# Patient Record
Sex: MALE | Race: WHITE
[De-identification: names, ages, dates, MRNs, and addresses within clinical notes are randomized per-mention and may not be internally consistent; named-entity substitution may affect disease eponyms.]

---

## 2017-03-23 ENCOUNTER — HOSPITAL ENCOUNTER (EMERGENCY)
Dept: HOSPITAL 61 - ER | Age: 42
Discharge: HOME | End: 2017-03-23
Payer: COMMERCIAL

## 2017-03-23 VITALS — SYSTOLIC BLOOD PRESSURE: 128 MMHG | DIASTOLIC BLOOD PRESSURE: 81 MMHG

## 2017-03-23 DIAGNOSIS — H66.012: ICD-10-CM

## 2017-03-23 DIAGNOSIS — J40: Primary | ICD-10-CM

## 2017-03-23 DIAGNOSIS — J45.909: ICD-10-CM

## 2017-03-23 PROCEDURE — 71020: CPT

## 2017-03-23 PROCEDURE — 99284 EMERGENCY DEPT VISIT MOD MDM: CPT

## 2017-03-23 PROCEDURE — 94250: CPT

## 2017-03-23 PROCEDURE — 94640 AIRWAY INHALATION TREATMENT: CPT

## 2017-03-23 NOTE — PHYS DOC
Past Medical History


Past Medical History:  Asthma


Past Surgical History:  Other


Additional Past Surgical Histo:  nose, right ear


Additional Information:  


Nonsmoker


Alcohol Use:  None


Drug Use:  None





Adult General


Chief Complaint


Chief Complaint:  Congestion





HPI


HPI


Patient is a 42  year old male with history of asthma who presents with 

productive cough and left ear pain for 4 days. He reports shortness of breath 

and nasal congestion as well. He denies fever, sore throat, vomiting, or 

diarrhea. He wears a hearing aid in the left ear. He states that there has been 

mild drainage from the ear, enough to make the hearing aid slightly damp. He 

takes Advair, pro-air, albuterol nebulizer, allergy medication, and Flonase at 

home. He has received a flu shot this season. He denies any known sick 

contacts. His PCP is Dr. uDval.





Review of Systems


Review of Systems


Constitutional: Denies fever or chills. []


Eyes: Denies change in visual acuity, redness, or eye pain. []


HENT: Denies sore throat. Reports left ear pain and drainage and nasal 

congestion.


Respiratory: Reports productive cough and shortness of breath.


Cardiovascular: Denies chest pain, palpitations or edema. []


GI: Denies abdominal pain, nausea, vomiting, bloody stools or diarrhea. []


: Denies dysuria, hematuria or urinary frequency. []


Musculoskeletal: Denies back pain or joint pain. []


Integument: Denies rash or skin lesions. []


Neurologic: Denies headache, focal weakness or sensory changes. []


Endocrine: Denies polyuria or polydipsia. []


Psych: Denies anxiety or depression. []





All systems reviewed and negative unless otherwise stated in the HPI.





Current Medications


Current Medications





 Current Medications








 Medications


  (Trade)  Dose


 Ordered  Sig/Cat  Start Time


 Stop Time Status Last Admin


Dose Admin


 


 Albuterol/


 Ipratropium


  (Duoneb)  3 ml  1X  ONCE  3/23/17 22:15


 3/23/17 22:16 DC 3/23/17 22:30


3 ML











Allergies


Allergies





 Allergies








Coded Allergies Type Severity Reaction Last Updated Verified


 


  No Known Drug Allergies    2/10/16 No











Physical Exam


Physical Exam





Constitutional: Well developed, well nourished, no acute distress, non-toxic 

appearance. []


HENT: Normocephalic, atraumatic, bilateral external ears normal, oropharynx 

moist, no oral exudates, nose normal. Right TM without erythema or bulging. 

Left TM is erythematous and perforated with mild purulent drainage in the ear 

canal without edema. There is no posterior pharyngeal erythema or tonsillar 

edema. Bilateral nasal turbinates are swollen and erythematous with purulent 

drainage.


Eyes: PERRLA, EOMI, conjunctiva normal, no discharge. [] 


Neck: Normal range of motion, no tenderness, supple, no stridor. [] 


Cardiovascular: Heart rate regular rhythm, no murmur []


Lungs & Thorax: There are diffuse expiratory wheezes with mild inspiratory 

wheezes in the lung bases bilaterally. No respiratory distress.


Skin: Warm, dry, no erythema, no rash. [] 


Neurologic: Alert and oriented X 3, normal motor function, normal sensory 

function, no focal deficits noted. []


Psychologic: Affect normal, judgement normal, mood normal. []





Current Patient Data


Vital Signs





 Vital Signs








  Date Time  Temp Pulse Resp B/P Pulse Ox O2 Delivery O2 Flow Rate FiO2


 


3/23/17 22:29     94 Room Air  


 


3/23/17 21:30 97.5 92 22     





 97.5       











EKG


EKG


[]





Radiology/Procedures


Radiology/Procedures


PA and lateral chest x-ray reviewed and interpreted by myself with Dr. Mckeon. 

There are no focal infiltrates or acute cardiopulmonary process.





Course & Med Decision Making


Course & Med Decision Making


Pertinent Labs and Imaging studies reviewed. (See chart for details)





The patient received a DuoNeb treatment in the emergency department. He reports 

improved breathing. He has mild scattered inspiratory wheezes diffusely.





Dragon Disclaimer


Dragon Disclaimer


This electronic medical record was generated, in whole or in part, using a 

voice recognition dictation system.





Departure


Departure


Impression:  


 Primary Impression:  


 Otitis media


 Additional Impression:  


 Bronchitis


Disposition:  01 HOME, SELF-CARE


Condition:  IMPROVED


Referrals:  


LAILA DUVAL MD (PCP)


Patient Instructions:  Acute Bronchitis, Easy-to-Read, Otitis Media, Adult, Easy

-to-Read





Additional Instructions:


Your chest x-ray does not show any pneumonia.


The left ear has an infection that has caused the eardrum to rupture.


Please complete all the prescribed antibiotics and steroids, even if you are 

feeling better.


Please continue to use your home medications as directed.


Return to the emergency department if you have any new or concerning symptoms.


Scripts


Amoxicillin 500 Mg Tablet2 Tab PO TID 10 Days


   Prov:SHAMIKA FERNANDEZ         3/23/17


Benzonatate 200 Mg Capsule1 Cap PO TID #30 CAP


   Prov:SHAMIKA FERNANDEZ         3/23/17


Prednisone 20 Mg Hchyeg32 Mg PO DAILY 5 Days


   Prov:SHAMIKA FERNANDEZ         3/23/17





Problem Qualifiers








 Primary Impression:  


 Otitis media


 Otitis media type:  suppurative  Laterality:  left  Chronicity:  acute  

Recurrence:  not specified as recurrent  Spontaneous tympanic membrane rupture:

  with spontaneous rupture  Qualified Code:  H66.012 - Acute suppurative otitis 

media with spontaneous rupture of ear drum, left ear





SHAMIKA FERNANDEZ Mar 23, 2017 22:58

## 2017-03-24 NOTE — RAD
Indication cough.



PA and lateral views of the chest were obtained and are compared to an

examination 9/29/2016.



The heart and pulmonary vessels appear within normal limits. A focal

infiltrate is not seen. Significant pleural fluid is not present and there is

no pneumothorax.



IMPRESSION: No acute or focal process seen in the chest

## 2017-07-01 ENCOUNTER — HOSPITAL ENCOUNTER (EMERGENCY)
Dept: HOSPITAL 61 - ER | Age: 42
Discharge: HOME | End: 2017-07-01
Payer: MEDICARE

## 2017-07-01 VITALS — DIASTOLIC BLOOD PRESSURE: 75 MMHG | SYSTOLIC BLOOD PRESSURE: 142 MMHG

## 2017-07-01 DIAGNOSIS — H60.392: Primary | ICD-10-CM

## 2017-07-01 DIAGNOSIS — J45.909: ICD-10-CM

## 2017-07-01 DIAGNOSIS — Z88.8: ICD-10-CM

## 2017-07-01 PROCEDURE — 99283 EMERGENCY DEPT VISIT LOW MDM: CPT

## 2017-07-01 NOTE — PHYS DOC
Past Medical History


Past Medical History:  Asthma


Past Surgical History:  Other


Additional Past Surgical Histo:  nose, right ear


Alcohol Use:  None


Drug Use:  None





Adult General


Chief Complaint


Chief Complaint:  EARACHE/EAR PAIN





HPI


HPI





Patient is a 42  year old male with history of asthma [place] on the left currently 

using hearing aids on the left who presents today with left ear pain and 

drainage that began today. Patient denies any fever coughing or congestion. 

Patient denies any worsening on his hearing condition. Patient denies swimming.





Review of Systems


Review of Systems





Constitutional: Denies fever or chills []


Eyes: Denies change in visual acuity, redness, or eye pain []


HENT:  left ear pain and drainage


Respiratory: Denies cough or shortness of breath []


Cardiovascular: No additional information not addressed in HPI []


Musculoskeletal: Denies back pain or joint pain []


Neurologic: Denies headache, focal weakness or sensory changes []


Endocrine: Denies polyuria or polydipsia []





Allergies


Allergies





Allergies








Coded Allergies Type Severity Reaction Last Updated Verified


 


  phenobarbital Allergy Severe SOB 7/1/17 Yes











Physical Exam


Physical Exam





Constitutional: Well developed, well nourished, no acute distress, non-toxic 

appearance. []


HENT: Normocephalic, atraumatic, bilateral external ears normal, oropharynx 

moist, no oral exudates, nose normal. []


Left ear canal is narrowed. There is mild amount of yellow debris in the left 

ear canal. Tragus is very painful on exam.


Eyes: PERRLA, EOMI, conjunctiva normal, no discharge. [] 


Cardiovascular:Heart rate regular rhythm, no murmur []


Lungs & Thorax:  Bilateral breath sounds clear to auscultation []


Skin: Warm, dry, no erythema, no rash. [] 


Back: No tenderness, no CVA tenderness. [] 


Extremities: No tenderness, no cyanosis, no clubbing, ROM intact, no edema. [] 


Neurologic: Alert and oriented X 3, normal motor function, normal sensory 

function, no focal deficits noted. []


Psychologic: Affect normal, judgement normal, mood normal. []





Current Patient Data


Vital Signs





 Vital Signs








  Date Time  Temp Pulse Resp B/P (MAP) Pulse Ox O2 Delivery O2 Flow Rate FiO2


 


7/1/17 18:34 97.6 63 20  100 Room Air  





 97.6       











EKG


EKG


[]





Radiology/Procedures


Radiology/Procedures


[]





Course & Med Decision Making


Course & Med Decision Making


Pertinent Labs and Imaging studies reviewed. (See chart for details)





Patient has left otitis externa. Discharged with oflaxacin,  follow-up with PCP 

in 1-2 weeks.





Dragon Disclaimer


Dragon Disclaimer


This electronic medical record was generated, in whole or in part, using a 

voice recognition dictation system.





Departure


Departure


Impression:  


 Primary Impression:  


 Left otitis externa


Disposition:  01 HOME, SELF-CARE


Condition:  STABLE


Referrals:  


LAILA QUAN MD (PCP)


Follow-up with your doctor in 1-2 weeks.


Patient Instructions:  Otitis Externa, Easy-to-Read





Additional Instructions:  


You were seen for left ear infection. Use the eardrops as prescribed. Follow-up 

with your doctor in 1-2 weeks.


Scripts


Ofloxacin (OFLOXACIN) 5 Ml Drops


5 DROP EACH EAR BID, #10 ML


   Prov: NICKY DAVIS APRN         7/1/17





Problem Qualifiers








 Primary Impression:  


 Left otitis externa


 Otitis externa type:  other infective  Chronicity:  acute  Qualified Codes:  

H60.392 - Other infective otitis externa, left ear








NICKY DAVIS APRN Jul 1, 2017 19:07

## 2019-03-09 ENCOUNTER — HOSPITAL ENCOUNTER (EMERGENCY)
Dept: HOSPITAL 61 - ER | Age: 44
Discharge: HOME | End: 2019-03-09
Payer: MEDICARE

## 2019-03-09 VITALS — DIASTOLIC BLOOD PRESSURE: 82 MMHG | SYSTOLIC BLOOD PRESSURE: 157 MMHG

## 2019-03-09 VITALS — WEIGHT: 175 LBS | BODY MASS INDEX: 25.92 KG/M2 | HEIGHT: 69 IN

## 2019-03-09 DIAGNOSIS — H66.004: Primary | ICD-10-CM

## 2019-03-09 DIAGNOSIS — J45.909: ICD-10-CM

## 2019-03-09 DIAGNOSIS — H66.015: ICD-10-CM

## 2019-03-09 DIAGNOSIS — Z88.8: ICD-10-CM

## 2019-03-09 PROCEDURE — 99283 EMERGENCY DEPT VISIT LOW MDM: CPT

## 2019-03-09 NOTE — PHYS DOC
Past Medical History


Past Medical History:  Asthma


 (JON ANGULO)


Past Surgical History:  Other


Additional Past Surgical Histo:  nose, right ear


 (JON ANGULO)


Alcohol Use:  None


Drug Use:  None


 (JON ANGULO)





Adult General


Chief Complaint


Chief Complaint:  EARACHE/EAR PAIN





HPI


HPI





Patient is a 43  year old male who was diagnosed with an ear infection by his 

PCP 4 days ago, he was unable to get the antibiotic that was prescribed until 

yesterday. Pt states he has taken 3 doses of the medication and his symptoms 

have not improved and his left ear has started draining fluid. Pt rates his 

pain a 6/10 on the pain scale, there are no alleviating or exacerbating 

factors. 


 (JON ANGULO)





Review of Systems


Review of Systems





Constitutional: Denies fever or chills []


Eyes: Denies redness, or eye pain []


HENT: Denies nasal congestion or sore throat ; see HPI[]


Respiratory: Denies cough or shortness of breath []


Cardiovascular: No additional information not addressed in HPI []


GI: Denies abdominal pain, nausea, vomiting,  or diarrhea []


Integument: Denies rash or skin lesions []


Neurologic: Denies headache


 (JON ANGULO)





Allergies


Allergies





Allergies








Coded Allergies Type Severity Reaction Last Updated Verified


 


  phenobarbital Allergy Severe SOB 7/1/17 Yes





 (LINDA CASTANON MD)





Physical Exam


Physical Exam





Constitutional: Well developed, well nourished, no acute distress, non-toxic 

appearance. []


HENT: Normocephalic, atraumatic, bilateral external ears normal, bilateral TMs 

infected- right TM effusion with moderate pus fluid, left TM unable to 

visualize perforation large amount of purulent drainage, posterior pharynx 

normal, oropharynx moist, no oral exudates, nose normal. []


Eyes:  conjunctiva normal, no discharge. [] 


Neck: Normal range of motion,  no stridor. [] 


Cardiovascular:Heart rate regular rhythm, no murmur []


Lungs & Thorax:  Bilateral breath sounds clear to auscultation []


Skin: Warm, dry, no erythema, no rash. [] 


Neurologic: Alert and oriented X 3,  no focal deficits noted. []


Psychologic: Affect normal, judgement normal, mood normal. []


 (JON ANGULO)





Current Patient Data


Vital Signs





 Vital Signs








  Date Time  Temp Pulse Resp B/P (MAP) Pulse Ox O2 Delivery O2 Flow Rate FiO2


 


3/9/19 20:36 97.7 87 16 157/82 (107) 96 Room Air  





 97.7       





 (LINDA CASTANON MD)





EKG


EKG


[]


 (JON ANGULO)





Radiology/Procedures


Radiology/Procedures


[]


 (JON ANGULO)





Course & Med Decision Making


Course & Med Decision Making


Pertinent Labs and Imaging studies reviewed. (See chart for details)





[]


 (JON ANGULO)


Course & Med Decision Making





Staff Physician Addendum:


I was working in the ER during the course of this patient's visit.  I was 

available for consultation as needed, but I was not directly involved in the 

care of this patient.    


 (LINDA CASTANON MD)


Dragon Disclaimer


Dragon Disclaimer


This electronic medical record was generated, in whole or in part, using a 

voice recognition dictation system.


 (JON ANGULO)





Departure


Departure


Impression:  


 Primary Impression:  


 Acute suppurative otitis media of left ear with spontaneous rupture of 

tympanic membrane


 Additional Impression:  


 Acute suppurative otitis media of right ear without spontaneous rupture of 

tympanic membrane


Disposition:  01 HOME, SELF-CARE


Condition:  STABLE


Referrals:  


LAILA QUAN MD (PCP)


Patient Instructions:  Otitis Media, Adult





Additional Instructions:  


Fill the prescription and take as directed in addition to the antibiotic 

prescribed by your primary care doctor. Tylenol or ibuprofen as needed for 

pain. Follow up with your primary care doctor next week for re-examination, 

return to the ER if symptoms worsen.


Scripts


Ofloxacin (OFLOXACIN) 5 Ml Drops


5 DROP LEFT EAR BID for 5 Days, #10 ML 0 Refills


   Prov: JON ANGULO         3/9/19





Problem Qualifiers








 Primary Impression:  


 Acute suppurative otitis media of left ear with spontaneous rupture of 

tympanic membrane


 Recurrence:  recurrent  Qualified Codes:  H66.015 - Acute suppurative otitis 

media with spontaneous rupture of ear drum, recurrent, left ear


 Additional Impression:  


 Acute suppurative otitis media of right ear without spontaneous rupture of 

tympanic membrane


 Recurrence:  recurrent  Qualified Codes:  H66.004 - Acute suppurative otitis 

media without spontaneous rupture of ear drum, recurrent, right ear








JON ANGULO Mar 9, 2019 21:20


LINDA CASTANON MD Mar 15, 2019 06:22

## 2019-03-31 ENCOUNTER — HOSPITAL ENCOUNTER (EMERGENCY)
Dept: HOSPITAL 61 - ER | Age: 44
LOS: 1 days | Discharge: HOME | End: 2019-04-01
Payer: MEDICARE

## 2019-03-31 VITALS — BODY MASS INDEX: 27.43 KG/M2 | WEIGHT: 181 LBS | HEIGHT: 68 IN

## 2019-03-31 VITALS — SYSTOLIC BLOOD PRESSURE: 163 MMHG | DIASTOLIC BLOOD PRESSURE: 81 MMHG

## 2019-03-31 DIAGNOSIS — H66.001: Primary | ICD-10-CM

## 2019-03-31 DIAGNOSIS — J45.909: ICD-10-CM

## 2019-03-31 DIAGNOSIS — I10: ICD-10-CM

## 2019-03-31 DIAGNOSIS — Z88.8: ICD-10-CM

## 2019-03-31 PROCEDURE — 99281 EMR DPT VST MAYX REQ PHY/QHP: CPT

## 2019-03-31 NOTE — PHYS DOC
Past Medical History


Past Medical History:  Asthma, Hypertension, Seizure


Past Surgical History:  Other


Additional Past Surgical Histo:  nose, right ear


Alcohol Use:  None


Drug Use:  None





Adult General


Chief Complaint


Chief Complaint:  EARACHE/EAR PAIN





HPI


HPI





Patient is a 44  year old male presented to the ER today for evaluation of 

fluid coming out of his right ear.  Patient has exudative otitis media on  

right ear.  Patient is currently on augmentin and ciprodex.  He has been put 

cipro solution in his right ear.  He also has been wearing hearing aid on his 

right are.  Today, his mom noted the drainage coming out of right ear with 

yellow color tinged with blood.  So she asked  him to come here for evaluation.





Review of Systems


Review of Systems





Constitutional: Denies fever or chills []


Eyes: Denies change in visual acuity, redness, or eye pain []


HENT: Denies nasal congestion or sore throat []


Respiratory: Denies cough or shortness of breath []


Cardiovascular: No additional information not addressed in HPI []


GI: Denies abdominal pain, nausea, vomiting, bloody stools or diarrhea []


: Denies dysuria or hematuria []


Musculoskeletal: Denies back pain or joint pain []


Integument: Denies rash or skin lesions []


Neurologic: Denies headache, focal weakness or sensory changes []


Endocrine: Denies polyuria or polydipsia []





All other systems were reviewed and found to be within normal limits, except as 

documented in this note.





Allergies


Allergies





Allergies








Coded Allergies Type Severity Reaction Last Updated Verified


 


  phenobarbital Allergy Severe SOB 7/1/17 Yes











Physical Exam


Physical Exam





Constitutional: Well developed, well nourished, no acute distress, non-toxic 

appearance. []


HENT: Normocephalic, atraumatic, clear fluid in right external ear canal 

consistent with the same concentration of CIPRO SOLUTION THAT HE HAS BEEN 

PUTTING IN HIS RIGHT EAR.  NO BLEEDING.  


Eyes: PERRLA, EOMI, conjunctiva normal, no discharge. [] 


Neck: Normal range of motion, no tenderness, supple, no stridor. [] 


Cardiovascular:Heart rate regular rhythm, no murmur []


Lungs & Thorax:  Bilateral breath sounds clear to auscultation []





Neurologic: Alert and oriented X 3, normal motor function, normal sensory 

function, no focal deficits noted. []


Psychologic: Affect normal, judgement normal, mood normal. []





Current Patient Data


Vital Signs





 Vital Signs








  Date Time  Temp Pulse Resp B/P (MAP) Pulse Ox O2 Delivery O2 Flow Rate FiO2


 


3/31/19 23:42 98.4 108 20 163/81 (108) 99 Room Air  





 98.4       











EKG


EKG


[]





Radiology/Procedures


Radiology/Procedures


[]





Course & Med Decision Making


Course & Med Decision Making


Pertinent Labs and Imaging studies reviewed. (See chart for details)





[]





Dragon Disclaimer


Dragon Disclaimer


This electronic medical record was generated, in whole or in part, using a 

voice recognition dictation system.





Departure


Departure


Impression:  


 Primary Impression:  


 Acute suppurative otitis media of right ear without spontaneous rupture of 

tympanic membrane


Disposition:  01 HOME, SELF-CARE


Condition:  STABLE


Referrals:  


LAILA QUAN MD (PCP)


YOU WILL NEED TO FOLLOW UP WITH ENT doctor next week for reevaluation.


Patient Instructions:  Otitis Media with Effusion





Additional Instructions:  


Please continue your current antibiotic.  Follow up with ENT doctor next week 

for reevaluation.











URBANO SALMON DO Mar 31, 2019 23:57

## 2020-12-24 ENCOUNTER — HOSPITAL ENCOUNTER (EMERGENCY)
Dept: HOSPITAL 61 - ER | Age: 45
Discharge: HOME | End: 2020-12-24
Payer: COMMERCIAL

## 2020-12-24 VITALS — BODY MASS INDEX: 25.96 KG/M2 | HEIGHT: 69 IN | WEIGHT: 175.27 LBS

## 2020-12-24 VITALS — DIASTOLIC BLOOD PRESSURE: 74 MMHG | SYSTOLIC BLOOD PRESSURE: 139 MMHG

## 2020-12-24 DIAGNOSIS — Z98.890: ICD-10-CM

## 2020-12-24 DIAGNOSIS — Z88.8: ICD-10-CM

## 2020-12-24 DIAGNOSIS — R50.9: ICD-10-CM

## 2020-12-24 DIAGNOSIS — H92.02: ICD-10-CM

## 2020-12-24 DIAGNOSIS — U07.1: Primary | ICD-10-CM

## 2020-12-24 DIAGNOSIS — G40.909: ICD-10-CM

## 2020-12-24 DIAGNOSIS — I10: ICD-10-CM

## 2020-12-24 DIAGNOSIS — J45.909: ICD-10-CM

## 2020-12-24 PROCEDURE — U0003 INFECTIOUS AGENT DETECTION BY NUCLEIC ACID (DNA OR RNA); SEVERE ACUTE RESPIRATORY SYNDROME CORONAVIRUS 2 (SARS-COV-2) (CORONAVIRUS DISEASE [COVID-19]), AMPLIFIED PROBE TECHNIQUE, MAKING USE OF HIGH THROUGHPUT TECHNOLOGIES AS DESCRIBED BY CMS-2020-01-R: HCPCS

## 2020-12-24 PROCEDURE — C9803 HOPD COVID-19 SPEC COLLECT: HCPCS

## 2020-12-24 PROCEDURE — 99283 EMERGENCY DEPT VISIT LOW MDM: CPT

## 2022-04-19 NOTE — PHYS DOC
Past Medical History


Past Medical History:  Asthma, Hypertension, Seizure


Past Surgical History:  Other


Additional Past Surgical Histo:  nose polyp removal, right eardrum surg


Smoking Status:  Never Smoker


Alcohol Use:  None


Drug Use:  None





General Adult


EDM:


Chief Complaint:  EARACHE/EAR PAIN





HPI:


HPI:


45-year-old male past medical history significant for hypertension, asthma, 

cognitive delay and seizure disorder, presents the ED with complaints of left 

ear pain that started yesterday and is now resolved but is complaining of some 

green drainage from the left ear.  Reports he has a hearing aid in his left ear.

 History of bilateral ear tubes and nasal polyps. Patient lives with his father 

who is currently a PUI in emergency department.  Accepts offer for Covid 

testing.





Review of Systems:


Review of Systems:


Constitutional:   Denies fever or chills, or lack of taste or smell


Eyes:   Denies change in visual acuity. []


HENT:   Denies nasal congestion or sore throat. [] 


Respiratory:   Denies cough or shortness of breath. [] 


Cardiovascular:   Denies chest pain or edema. [] 


GI:   Denies abdominal pain, nausea, vomiting, bloody stools or diarrhea. [] 


:  Denies dysuria or hematuria


Musculoskeletal:   Denies back pain or joint pain. [] 


Integument:   Denies rash or crepitus


Neurologic:   Denies headache, focal weakness or sensory changes. [] 


Endocrine:   Denies polyuria or polydipsia. [] 


Lymphatic:  Denies swollen glands. [] 


Psychiatric:  Denies depression or anxiety. []





Heart Score:


Risk Factors:


Risk Factors:  DM, Current or recent (<one month) smoker, HTN, HLP, family 

history of CAD, obesity.


Risk Scores:


Score 0 - 3:  2.5% MACE over next 6 weeks - Discharge Home


Score 4 - 6:  20.3% MACE over next 6 weeks - Admit for Clinical Observation


Score 7 - 10:  72.7% MACE over next 6 weeks - Early Invasive Strategies





Allergies:


Allergies:





Allergies








Coded Allergies Type Severity Reaction Last Updated Verified


 


  phenobarbital Allergy Severe SOB 7/1/17 Yes











Physical Exam:


PE:





Constitutional: Well developed, well nourished, no acute distress, non-toxic 

appearance, afebrile


HENT: Normocephalic, atraumatic, right ear normal TM and external canal, left 

ear with no mastoid tenderness or obliteration of auricular crease, yellow 

otorrhea and external otitis canal with no TM erythema or effusion, helix and 

external canal with no erythema, poor dentition


Eyes: EOMI, conjunctiva normal, no discharge.  


Neck: Normal range of motion,  supple, no nuchal rigidity


Cardiovascular: S1/2 present, regular rhythm


Lungs & Thorax: Speaking in full sentences, bilateral equal chest rise, no 

tachypnea or increased work of breathing


Abdomen:  soft, no tenderness, 


Skin: Warm, dry, no erythema, no rash. [] 


Back: No tenderness, no CVA tenderness. [] 


Extremities: No tenderness, no cyanosis, no edema


Neurologic: Alert and oriented X 3, normal motor function, normal sensory 

function, no focal deficits noted. []


Psychologic: Affect normal, judgement normal, mood normal, smiling/happy-makes 

jokes





Current Patient Data:


Vital Signs:





                                   Vital Signs








  Date Time  Temp Pulse Resp B/P (MAP) Pulse Ox O2 Delivery O2 Flow Rate FiO2


 


12/24/20 04:00 97.7 74 20 139/74 (95) 96 Room Air  





 97.7       











EKG:


EKG:


[]





Radiology/Procedures:


Radiology/Procedures:


[]





Course & Med Decision Making:


Course & Med Decision Making


Pertinent Labs and Imaging studies reviewed. (See chart for details)





Concern for left otalgia that has since resolved with otorrhea, no obvious 

erythema of the helix, external canal or tympanic membrane.  We will treat with 

antibiotics and reevaluation in 2 to 3 days by primary care physician or ENT.  

Covid test pending per patient's request and exposure to his father whom he 

lives with.  Will discharge home with strict ED return precautions were given 

for fever, worsening ear pain, neck stiffness, or headache. Encouraged urgent 

outpatient follow-up with PMD and ENT.  Life-threatening processes were 

considered but are low suspicion at this time, given history, physical exam and 

ED workup. Pt was educated on all prescription medications and adverse effects. 

 All patient's questions were answered and pt was stable at time of discharge.





Life/limb-threatening differential includes but is not limited to, auricular 

hematoma or perichondritis, malignant otitis externa, otitis externa or media, 

otomycosis, bullous myringitis, mastoiditis, hearing loss or vestibular 

disorder, tympanic membrane rupture, herpes zoster oticus, contact dermatitis, 

cholesteatoma, meningitis, brain abscess or venous/cavernous/cerebral sinus 

thrombosis.





I spoken with the patient and her caregivers.  I explained the patient's 

condition, diagnoses and treatment plan based on the information available to me

 at this time.  I have answered the patient and her caregiver's questions and 

addressed any concerns.  The patient and her caregivers have a good 

understanding of patient's diagnosis, condition and treatment plan as can be ex

pected at this point.  Vital signs have been stable.  Patient's condition is 

stable and appropriate for discharge from the emergency department. 





Patient will pursue further outpatient evaluation with primary care physician or

 other designated or consulting physician as outlined in the discharge 

instructions.  The patient and/or caregivers are agreeable to this plan of care 

and follow-up instructions have been explained in detail.  The patient and/or 

caregivers have received these instructions in written form and have expressed 

an understanding of the discharge instructions.  The patient and/or caregivers 

are aware that any significant change of condition or worsening of symptoms 

should prompt immediate return to this or the closest emergency department or 

call to [facility].





Antoine Disclaimer:


Dragon Disclaimer:


This electronic medical record was generated, in whole or in part, using a voice

 recognition dictation system.





Departure


Departure


Impression:  


   Primary Impression:  


   Otorrhea of left ear


   Additional Impression:  


   Otalgia of left ear


Disposition:  01 DC HOME SELF CARE/HOMELESS


Condition:  STABLE


Referrals:  


LAILA QUAN MD (PCP)


within 7 days


Patient Instructions:  Otitis Media, Adult





Additional Instructions:  


FOLLOW UP WITH ENT:





Otolaryngology


Address:


[address] [place] [address], [id number]


[place] [address]


Phone:


[phone]





Oral and Maxillofacial Surgery


[facility]


Address:


[address] [place] [id number]


Phone:


[phone]





EMERGENCY DEPARTMENT GENERAL DISCHARGE INSTRUCTIONS





Thank you for coming to [facility] Emergency Department (ED) 

today and 


trusting us with you care.  We trust that you had a positive experience in our 

Emergency 


Department.  If you wish to speak to the department management, you may call the

 Director at 


[phone].





YOUR FOLLOW UP INSTRUCTIONS ARE AS FOLLOWS:





1.  Do you have a private Doctor?  If you do not have a private doctor, please 

ask for a 


resource list of physicians or clinics that may be able to assist you with 

follow up care.





2.  The Emergency Physicain has interpreted your x-rays.  The X-Ray specialist 

will also 


review them.  If there is a change in the findings, you will be notified in 48 

hours when at 


all possible.





3.  A lab test or culture has been done, your results will be reviewed and you 

will be 


notified if you need a change in treatment.





ADDITIONAL INSTRUCTIONS AND INFORMATION:





1.  Your care today has been supervised by a physician who is specially trained 

in emergency 


care.  Many problems require more than one evaluation for a complete diagnosis 

and 


treatment.  We recommend that you schedule your follow up appointment as 

recommended to 


ensure complete treatment of you illness or injury.  If you are unable to obtain

 follow up 


care and continue to have a problem, or if your condition worsens, we recommend 

that you 


return to the ED.





2.  We are not able to safely determine your condition over the phone nor are we

 able to 


give sound medical advice over the phone.  For these safety reasons, if you call

 for medical 


advice we will ask you to come to the ED for further evaluation.





3.  If you have any questions regarding these discharge instructions please call

 the ED at 


[phone].





SAFETY INFORMATION:





In the interest of safety, wellness, and injury prevention; we encourage you to 

wear your 


sealbelt, if you smoke; quite smoking, and we encourage family to use a 

protective helmet 


for bicycling and other sporting events that present an increased risk for head 

injury.





IF YOUR SYMPTOMS WORSEN OR NEW SYMPTOMS DEVELOP, OR YOU HAVE CONCERNS ABOUT YOUR

 CONDITION; 


OR IF YOUR CONDITION WORSENS WHILE YOU ARE WAITING FOR YOUR FOLLOW UP APPOINTM

ENT; EITHER 


CONTACT YOUR PRIMARY CARE DOCTOR, THE PHYSICIAN WHOSE NAME AND NUMBER YOU WERE 

GIVEN, OR 


RETURN TO THE ED IMMEDIATELY.


Scripts


Amoxicillin/Potassium Clav (AUGMENTIN 875-125 TABLET) 1 Each Tablet


1 TAB PO Q12HR for 10 Days, #20 TAB


   Prov: LAY العلي DO         12/24/20











LAY العلي DO               Dec 24, 2020 04:29 [FreeTextEntry1] : Physical Exam:\par Constitutional: No acute distress, well appearing\par HEENT: Normocephalic, atraumatic\par Neck: supple\par Cardiac: S1S2, Regular rate and rhythm, No murmurs\par Pulmonary: No respiratory distress, Lungs clear to auscultation bilaterally, no wheezing, rales, or rhonchi\par Abdomen: Soft, non-tender, non-distended, no guarding, normal bowel sounds\par Vascular: No peripheral edema\par Neurology: Coordination grossly intact, no focal deficits\par Psychiatric: Alert and oriented x3, normal mood\par \par \par \par A/P:\par alcohol abuse: \par drinking only occasionally now \par resolved\par \par smoker: \par resolved\par \par HLD: \par - given script to do fasting bloodwork. he will get done tomorrow at outside lab\par \par HTN: \par BP remains elevated\par - will start amlodipine 2.5mg\par - recommended to f/u 1 month\par \par psoriasis: \par - regular follow up with derm